# Patient Record
Sex: FEMALE
[De-identification: names, ages, dates, MRNs, and addresses within clinical notes are randomized per-mention and may not be internally consistent; named-entity substitution may affect disease eponyms.]

---

## 2018-10-01 PROBLEM — Z00.00 ENCOUNTER FOR PREVENTIVE HEALTH EXAMINATION: Status: ACTIVE | Noted: 2018-10-01

## 2018-11-07 ENCOUNTER — APPOINTMENT (OUTPATIENT)
Dept: DERMATOLOGY | Facility: CLINIC | Age: 63
End: 2018-11-07

## 2021-08-10 ENCOUNTER — EMERGENCY (EMERGENCY)
Facility: HOSPITAL | Age: 66
LOS: 1 days | Discharge: HOME | End: 2021-08-10
Attending: EMERGENCY MEDICINE
Payer: COMMERCIAL

## 2021-08-10 VITALS
WEIGHT: 149.91 LBS | RESPIRATION RATE: 18 BRPM | OXYGEN SATURATION: 100 % | SYSTOLIC BLOOD PRESSURE: 109 MMHG | HEIGHT: 65 IN | DIASTOLIC BLOOD PRESSURE: 75 MMHG | HEART RATE: 87 BPM | TEMPERATURE: 99 F

## 2021-08-10 LAB
ANION GAP SERPL CALC-SCNC: 13 MMOL/L — SIGNIFICANT CHANGE UP (ref 5–17)
BASOPHILS # BLD AUTO: 0.08 K/UL — SIGNIFICANT CHANGE UP (ref 0–0.2)
BASOPHILS NFR BLD AUTO: 0.8 % — SIGNIFICANT CHANGE UP (ref 0–2)
BUN SERPL-MCNC: 20.5 MG/DL — HIGH (ref 8–20)
CALCIUM SERPL-MCNC: 10 MG/DL — SIGNIFICANT CHANGE UP (ref 8.6–10.2)
CHLORIDE SERPL-SCNC: 101 MMOL/L — SIGNIFICANT CHANGE UP (ref 98–107)
CO2 SERPL-SCNC: 23 MMOL/L — SIGNIFICANT CHANGE UP (ref 22–29)
CREAT SERPL-MCNC: 0.5 MG/DL — SIGNIFICANT CHANGE UP (ref 0.5–1.3)
D DIMER BLD IA.RAPID-MCNC: <150 NG/ML DDU — SIGNIFICANT CHANGE UP
EOSINOPHIL # BLD AUTO: 0.7 K/UL — HIGH (ref 0–0.5)
EOSINOPHIL NFR BLD AUTO: 6.8 % — HIGH (ref 0–6)
GLUCOSE SERPL-MCNC: 116 MG/DL — HIGH (ref 70–99)
HCT VFR BLD CALC: 34.2 % — LOW (ref 34.5–45)
HGB BLD-MCNC: 11.5 G/DL — SIGNIFICANT CHANGE UP (ref 11.5–15.5)
IMM GRANULOCYTES NFR BLD AUTO: 0.4 % — SIGNIFICANT CHANGE UP (ref 0–1.5)
LYMPHOCYTES # BLD AUTO: 1.51 K/UL — SIGNIFICANT CHANGE UP (ref 1–3.3)
LYMPHOCYTES # BLD AUTO: 14.7 % — SIGNIFICANT CHANGE UP (ref 13–44)
MCHC RBC-ENTMCNC: 33.6 GM/DL — SIGNIFICANT CHANGE UP (ref 32–36)
MCHC RBC-ENTMCNC: 34.1 PG — HIGH (ref 27–34)
MCV RBC AUTO: 101.5 FL — HIGH (ref 80–100)
MONOCYTES # BLD AUTO: 1.15 K/UL — HIGH (ref 0–0.9)
MONOCYTES NFR BLD AUTO: 11.2 % — SIGNIFICANT CHANGE UP (ref 2–14)
NEUTROPHILS # BLD AUTO: 6.81 K/UL — SIGNIFICANT CHANGE UP (ref 1.8–7.4)
NEUTROPHILS NFR BLD AUTO: 66.1 % — SIGNIFICANT CHANGE UP (ref 43–77)
PLATELET # BLD AUTO: 197 K/UL — SIGNIFICANT CHANGE UP (ref 150–400)
POTASSIUM SERPL-MCNC: 3.9 MMOL/L — SIGNIFICANT CHANGE UP (ref 3.5–5.3)
POTASSIUM SERPL-SCNC: 3.9 MMOL/L — SIGNIFICANT CHANGE UP (ref 3.5–5.3)
RBC # BLD: 3.37 M/UL — LOW (ref 3.8–5.2)
RBC # FLD: 11.3 % — SIGNIFICANT CHANGE UP (ref 10.3–14.5)
SODIUM SERPL-SCNC: 137 MMOL/L — SIGNIFICANT CHANGE UP (ref 135–145)
WBC # BLD: 10.29 K/UL — SIGNIFICANT CHANGE UP (ref 3.8–10.5)
WBC # FLD AUTO: 10.29 K/UL — SIGNIFICANT CHANGE UP (ref 3.8–10.5)

## 2021-08-10 PROCEDURE — 85379 FIBRIN DEGRADATION QUANT: CPT

## 2021-08-10 PROCEDURE — 36415 COLL VENOUS BLD VENIPUNCTURE: CPT

## 2021-08-10 PROCEDURE — 80048 BASIC METABOLIC PNL TOTAL CA: CPT

## 2021-08-10 PROCEDURE — 93005 ELECTROCARDIOGRAM TRACING: CPT

## 2021-08-10 PROCEDURE — 99284 EMERGENCY DEPT VISIT MOD MDM: CPT | Mod: 25

## 2021-08-10 PROCEDURE — 93010 ELECTROCARDIOGRAM REPORT: CPT

## 2021-08-10 PROCEDURE — 85025 COMPLETE CBC W/AUTO DIFF WBC: CPT

## 2021-08-10 PROCEDURE — 96374 THER/PROPH/DIAG INJ IV PUSH: CPT

## 2021-08-10 PROCEDURE — 99285 EMERGENCY DEPT VISIT HI MDM: CPT

## 2021-08-10 RX ORDER — KETOROLAC TROMETHAMINE 30 MG/ML
15 SYRINGE (ML) INJECTION ONCE
Refills: 0 | Status: DISCONTINUED | OUTPATIENT
Start: 2021-08-10 | End: 2021-08-10

## 2021-08-10 RX ORDER — VALACYCLOVIR 500 MG/1
1 TABLET, FILM COATED ORAL
Qty: 21 | Refills: 0
Start: 2021-08-10 | End: 2021-08-16

## 2021-08-10 RX ORDER — VALACYCLOVIR 500 MG/1
1000 TABLET, FILM COATED ORAL ONCE
Refills: 0 | Status: COMPLETED | OUTPATIENT
Start: 2021-08-10 | End: 2021-08-10

## 2021-08-10 RX ORDER — METHOCARBAMOL 500 MG/1
1 TABLET, FILM COATED ORAL
Qty: 12 | Refills: 0
Start: 2021-08-10 | End: 2021-08-12

## 2021-08-10 RX ADMIN — Medication 15 MILLIGRAM(S): at 10:19

## 2021-08-10 RX ADMIN — VALACYCLOVIR 1000 MILLIGRAM(S): 500 TABLET, FILM COATED ORAL at 10:20

## 2021-08-10 NOTE — ED STATDOCS - PROGRESS NOTE DETAILS
66 y/o female presents to ED c/o rib pain/injury. Patient reports left sided chest wall discomfort, atraumatic in nature, pain on deep breath and movement. Patient is vaccinated for COVID, no infectious symptoms. Will send to main.

## 2021-08-10 NOTE — ED PROVIDER NOTE - PHYSICAL EXAMINATION
Const: Well-appearing. In no apparent distress at rest.  Skin: Warm and dry. Single erythematous lesion along T11 dermatome on the left lateral back.   HENT: NC/AT. Moist mucus membranes.  Eyes: PERRLA. EOMI. No scleral icterus. No conjunctival erythema.  Resp: Lungs CTAB. Speaking in full sentences.  Cardiac: Normal rhythm and rate. + S1/S2 with no murmur or gallop. 2+ peripheral pulses bilat.  ABD: Soft, non-tender, non-distended throughout. No bruising, masses, pulsations.  MSK: Spine midline and non-tender. Full ROM. Left lateral back pain with back extension.  Neuro: Awake, alert and oriented to person, place and time. Motor and sensation grossly intact. Const: Well-appearing. In no apparent distress at rest.  Skin: Warm and dry. Single erythematous lesion along T11 dermatome on the left lateral back. No vesicles  HENT: NC/AT. Moist mucus membranes.  Eyes: PERRLA. EOMI. No scleral icterus. No conjunctival erythema.  Resp: Lungs CTAB. Speaking in full sentences.  Cardiac: Normal rhythm and rate. + S1/S2. 2+ peripheral pulses bilat.  ABD: Soft, non-tender, non-distended throughout. No bruising, masses, pulsations.  MSK: Spine midline and non-tender. Full ROM ext x 4. +Mild ttp along left posterolateral ribs around T10-11  Neuro: Awake, alert and oriented to person, place and time. Motor and sensation grossly intact.

## 2021-08-10 NOTE — ED PROVIDER NOTE - PATIENT PORTAL LINK FT
You can access the FollowMyHealth Patient Portal offered by [facility] by registering at the following website: [email or web address]. By joining [employer]’s FollowMyHealth portal, you will also be able to view your health information using other applications (apps) compatible with our system.

## 2021-08-10 NOTE — ED PROVIDER NOTE - NSFOLLOWUPINSTRUCTIONS_ED_ALL_ED_FT
- Follow up with your doctor within 2-3 days.   - Return to the ED for any new or worsening symptoms.   - Take prescribed medications as directed

## 2021-08-10 NOTE — ED PROVIDER NOTE - OBJECTIVE STATEMENT
64 y/o F with PMH asthmatic bronchitis c/o intermittent sharp pain over left lateral ribs 10-11 x 1 week. Reports worsening pain with movement and with deep inspiration. Alleviated pain with standing or lying flat. This morning, pt noticed sharp pain over anterior left lateral ribs 10-11. Pain has gotten progressively worse over the past couple of weeks. Pt had 18 hour travel day 3 weeks ago. Denies fever/chills, cough, N/V, shortness of breath, chest pain, paresthesias. 64 y/o F with PMHx asthmatic bronchitis c/o intermittent sharp pain over left posterolateral ribs 10-11 x 1 week. Reports worsening pain with movement and with deep inspiration. Alleviated pain with standing or lying flat. This morning, pt noticed sharp pain over anterior left lateral ribs 10-11. Pain has gotten progressively worse over the past couple of weeks so came to ED today. Pt had 18 hour travel day 3 weeks ago. No fall or trauma. Denies fever/chills, rash, N/V, shortness of breath, chest pain, paresthesias, abd pain.

## 2021-08-10 NOTE — ED PROVIDER NOTE - ATTENDING CONTRIBUTION TO CARE
I personally saw the patient with the PA and student, and completed the key components of the history and physical exam. I then discussed the management plan with the PA and student.     66 y/o F with COPD presents with sharp pain that started on the left side of her back around rib 11 1 week ago now radiating around the front under her left breast. The pain is sharp, worse with movement or lying back. She has not noticed any rashes. SHe denies SOB, but notes recent travel for 18 hours a few days ago. Denies chest pain, abdominal pain, nauesa, vomiting or diarphoresis.    AP - well appearing, allodynia in the left T11 dermatome with small erythematous rash, no vesicular lesions, RRR, lungs CTA B/L, abd soft NT/ND, no LE edema or calf TTP, 2+ symmetrical distal pulses.    Will evaluate with labs, EKG, CXR - likely prodrome of zoster - will treat, discussed indications to return and contact precautions.

## 2021-08-10 NOTE — ED PROVIDER NOTE - CLINICAL SUMMARY MEDICAL DECISION MAKING FREE TEXT BOX
64yo F presenting with left lateral rib pain x 1 week, no trauma or falls. EKG NSR. No visible lesions on exam however given dermatomal pattern of pain sx concerning for shingles, tx prophylactically with valtrex. Will check basics, dimer and give toradol.

## 2021-08-10 NOTE — ED ADULT TRIAGE NOTE - CHIEF COMPLAINT QUOTE
patient c/o left sided rib pain for over 2 weeks, making difficult to breath, woke up with the pain today, denies any injury to area.

## 2021-10-26 ENCOUNTER — TRANSCRIPTION ENCOUNTER (OUTPATIENT)
Age: 66
End: 2021-10-26

## 2022-12-27 NOTE — ED ADULT NURSE NOTE - CCCP TRG CHIEF CMPLNT
Received a call back from the patient regarding scheduling a follow up appointment so her refill of Anastrozole can be processed. Spoke with the patient and let her know writer will have the  assist her with scheduling her follow up and writer will send the refill to [facility] in [address] as requested, she states she understands, agrees, and has no questions or concerns at this time. Noted she is scheduled for follow up 1/30 so writer will send a refill to get her through to this appointment as she stated she had about a week left of her previous prescription.  
rib pain/injury

## 2024-08-07 NOTE — ED PROVIDER NOTE - PRINCIPAL DIAGNOSIS
[email or web address]
Musculoskeletal pain